# Patient Record
(demographics unavailable — no encounter records)

---

## 2025-02-13 NOTE — HISTORY OF PRESENT ILLNESS
[FreeTextEntry1] : ADHD  [de-identified] : pt was ok initially on vyvanse but had some mood swings resolved but now better and feels like he still has some "dialogue" in head that highter dose

## 2025-04-24 NOTE — HISTORY OF PRESENT ILLNESS
[FreeTextEntry1] : f u  [de-identified] : better on 30mg adhd mneds no carbs eating better keto  doing 50K Vit D